# Patient Record
Sex: FEMALE | Race: BLACK OR AFRICAN AMERICAN
[De-identification: names, ages, dates, MRNs, and addresses within clinical notes are randomized per-mention and may not be internally consistent; named-entity substitution may affect disease eponyms.]

---

## 2019-03-07 ENCOUNTER — HOSPITAL ENCOUNTER (EMERGENCY)
Dept: HOSPITAL 62 - ER | Age: 19
LOS: 1 days | Discharge: ANOTHER HOSPITAL | End: 2019-03-08
Payer: OTHER GOVERNMENT

## 2019-03-07 VITALS — DIASTOLIC BLOOD PRESSURE: 72 MMHG | SYSTOLIC BLOOD PRESSURE: 114 MMHG

## 2019-03-07 DIAGNOSIS — R26.2: ICD-10-CM

## 2019-03-07 DIAGNOSIS — G24.9: Primary | ICD-10-CM

## 2019-03-07 DIAGNOSIS — R51: ICD-10-CM

## 2019-03-07 LAB
ADD MANUAL DIFF: NO
ALBUMIN SERPL-MCNC: 5 G/DL (ref 3.7–5.6)
ALP SERPL-CCNC: 65 U/L (ref 50–135)
ALT SERPL-CCNC: 14 U/L (ref 5–35)
ANION GAP SERPL CALC-SCNC: 12 MMOL/L (ref 5–19)
APPEARANCE ALL TUBES: CLEAR
APPEARANCE ALL TUBES: CLEAR
AST SERPL-CCNC: 20 U/L (ref 5–30)
BASOPHILS # BLD AUTO: 0.1 10^3/UL (ref 0–0.2)
BASOPHILS NFR BLD AUTO: 0.7 % (ref 0–2)
BILIRUB DIRECT SERPL-MCNC: 0.2 MG/DL (ref 0–0.4)
BILIRUB SERPL-MCNC: 0.3 MG/DL (ref 0.2–1.3)
BUN SERPL-MCNC: 12 MG/DL (ref 7–20)
CALCIUM: 10 MG/DL (ref 8.4–10.2)
CHLORIDE SERPL-SCNC: 112 MMOL/L (ref 98–107)
CK SERPL-CCNC: 844 U/L (ref 30–135)
CO2 SERPL-SCNC: 19 MMOL/L (ref 22–30)
COLOR ALL TUBES: COLORLESS
COLOR ALL TUBES: COLORLESS
CSF TOTAL VOLUME: 3.8 CC
CSF TOTAL VOLUME: 3.8 CC
CSF TUBE NUMBER: 1
CSF TUBE NUMBER: 4
EOSINOPHIL # BLD AUTO: 0.1 10^3/UL (ref 0–0.6)
EOSINOPHIL NFR BLD AUTO: 0.7 % (ref 0–6)
ERYTHROCYTE [DISTWIDTH] IN BLOOD BY AUTOMATED COUNT: 13.3 % (ref 11.5–14)
GLUCOSE CSF-MCNC: 51 MG/DL (ref 40–70)
GLUCOSE SERPL-MCNC: 85 MG/DL (ref 75–110)
HCT VFR BLD CALC: 39.2 % (ref 36–47)
HGB BLD-MCNC: 13.8 G/DL (ref 12–15.5)
LYMPHOCYTES # BLD AUTO: 2.3 10^3/UL (ref 0.5–4.7)
LYMPHOCYTES NFR BLD AUTO: 24.3 % (ref 13–45)
MCH RBC QN AUTO: 30.8 PG (ref 27–33.4)
MCHC RBC AUTO-ENTMCNC: 35.1 G/DL (ref 32–36)
MCV RBC AUTO: 88 FL (ref 80–97)
MONOCYTES # BLD AUTO: 0.6 10^3/UL (ref 0.1–1.4)
MONOCYTES NFR BLD AUTO: 6.1 % (ref 3–13)
NEUTROPHILS # BLD AUTO: 6.4 10^3/UL (ref 1.7–8.2)
NEUTS SEG NFR BLD AUTO: 68.2 % (ref 42–78)
PLATELET # BLD: 235 10^3/UL (ref 150–450)
POTASSIUM SERPL-SCNC: 4.2 MMOL/L (ref 3.6–5)
PROT SERPL-MCNC: 7.8 G/DL (ref 6.3–8.2)
PROTEIN,CSF: 28 MG/DL (ref 12–60)
RBC # BLD AUTO: 4.47 10^6/UL (ref 3.72–5.28)
SODIUM SERPL-SCNC: 143.4 MMOL/L (ref 137–145)
TOTAL CELLS COUNTED % (AUTO): 100 %
VOLUME TUBE 1: 1 CC
VOLUME TUBE 1: 1 CC
VOLUME TUBE 2: 0.9 CC
VOLUME TUBE 2: 0.9 CC
VOLUME TUBE 3: 0.9 CC
VOLUME TUBE 3: 0.9 CC
VOLUME TUBE 4: 1 CC
VOLUME TUBE 4: 1 CC
WBC # BLD AUTO: 9.3 10^3/UL (ref 4–10.5)

## 2019-03-07 PROCEDURE — 87205 SMEAR GRAM STAIN: CPT

## 2019-03-07 PROCEDURE — 36415 COLL VENOUS BLD VENIPUNCTURE: CPT

## 2019-03-07 PROCEDURE — 82945 GLUCOSE OTHER FLUID: CPT

## 2019-03-07 PROCEDURE — 80053 COMPREHEN METABOLIC PANEL: CPT

## 2019-03-07 PROCEDURE — 84702 CHORIONIC GONADOTROPIN TEST: CPT

## 2019-03-07 PROCEDURE — 85025 COMPLETE CBC W/AUTO DIFF WBC: CPT

## 2019-03-07 PROCEDURE — 99291 CRITICAL CARE FIRST HOUR: CPT

## 2019-03-07 PROCEDURE — 87070 CULTURE OTHR SPECIMN AEROBIC: CPT

## 2019-03-07 PROCEDURE — 89050 BODY FLUID CELL COUNT: CPT

## 2019-03-07 PROCEDURE — 96372 THER/PROPH/DIAG INJ SC/IM: CPT

## 2019-03-07 PROCEDURE — 83735 ASSAY OF MAGNESIUM: CPT

## 2019-03-07 PROCEDURE — 70450 CT HEAD/BRAIN W/O DYE: CPT

## 2019-03-07 PROCEDURE — 62270 DX LMBR SPI PNXR: CPT

## 2019-03-07 PROCEDURE — 83916 OLIGOCLONAL BANDS: CPT

## 2019-03-07 PROCEDURE — 84157 ASSAY OF PROTEIN OTHER: CPT

## 2019-03-07 PROCEDURE — 82550 ASSAY OF CK (CPK): CPT

## 2019-03-07 PROCEDURE — 96360 HYDRATION IV INFUSION INIT: CPT

## 2019-03-07 NOTE — RADIOLOGY REPORT (SQ)
EXAM DESCRIPTION: 



CT HEAD WITHOUT IV CONTRAST



COMPLETED DATE/TME:  03/07/2019 19:48



CLINICAL HISTORY: 



18 years, Female, involuntary clonic -right sided mostly



COMPARISON:

None.



TECHNIQUE:

Axial images of the head were performed without the use of

intravenous contrast, with sagittal and coronal reformatted

images.  Images stored on PACS.

 

All CT scanners at this facility use dose modulation, iterative

reconstruction, and/or weight based dosing when appropriate to

reduce radiation dose to as low as reasonably achievable (ALARA).





[facility]: Dose Right [facility]: CareDose   [facility]: Dose Right    [facility]:

Teradose 4D    [facility]: Smart Purple Blue Bo



LIMITATIONS:

None.



FINDINGS:



Somewhat limited examination due to motion artifact.



No evidence of acute hemorrhage or infarct. 



No evidence of mass or hydrocephalus.





IMPRESSION:



No acute finding.

 

TECHNICAL DOCUMENTATION:



Quality ID # [id number]: Final reports with documentation of one or more

dose reduction techniques (e.g., Automated exposure control,

adjustment of the mA and/or kV according to patient size, use of

iterative reconstruction technique)



copyright 2011 [employer]- All Rights Reserved

## 2019-03-07 NOTE — ER DOCUMENT REPORT
ED Medical Screen (RME)





- General


Chief Complaint: Tremor


Stated Complaint: TREMOR


Time Seen by Provider: 03/07/19 19:44


Primary Care Provider: 


JANELLE JEONG MD [Primary Care Provider] - Follow up as needed


Mode of Arrival: Wheelchair


Information source: Patient


Notes: 





18-year-old female presented to ED for complete body shaking.  She states she 

had similar symptoms since Monday.  She states yesterday she went to [facility] [place] they gave her some Valium and she got better.  She states as the day 

progresses today the shaking is gotten much worse.  Total body shaking started 

around 4 PM tonight.  I did consult Dr. Patino for this patient as she is unable 

to stop the shaking.  I have ordered 1 mg of Ativan IM.  Dr. Patino stated he 

would put orders in for the patient and I have transferred the patient back to 

room 20.











I have greeted and performed a rapid initial assessment of this patient.  A 

comprehensive ED assessment and evaluation of the patient, analysis of test 

results and completion of medical decision making process will be conducted by 

an additional ED providers.


TRAVEL OUTSIDE OF THE [place] IN LAST 30 DAYS: No





- Related Data


Allergies/Adverse Reactions: 


                                        





No Known Allergies Allergy (Unverified 03/07/19 16:59)


   











Physical Exam





- Vital signs


Vitals: 





                                        











Temp Pulse Resp BP Pulse Ox


 


 97.9 F   140 H  18   163/113 H  98 


 


 03/07/19 17:13  03/07/19 17:13  03/07/19 17:13  03/07/19 17:13  03/07/19 17:13














Course





- Vital Signs


Vital signs: 





                                        











Temp Pulse Resp BP Pulse Ox


 


 97.9 F   140 H  18   163/113 H  98 


 


 03/07/19 17:13  03/07/19 17:13  03/07/19 17:13  03/07/19 17:13  03/07/19 17:13














Doctor's Discharge





- Discharge


Referrals: 


JANELLE JEONG MD [Primary Care Provider] - Follow up as needed

## 2019-03-07 NOTE — ER DOCUMENT REPORT
Doctor's Note


Notes: 





03/07/19 22:18


I did assume care of this patient from the physician assistant.  In summary this

patient is a 18-year-old female who presents with 4 days of progressively 

worsening dystonia.  This appears to be largely exertional in nature.  On 

examination the patient has minimal jerking with bilateral upper extremity testi

ng however in the bilateral lower externally she has profound dystonic movements

with any effort both distally and proximally.  After multiple attempts I am able

to get her knee reflexes 2 result at approximate 2+ although her ankle reflexes 

are muted bilaterally.  She has no true strength loss, 5 out of 5 both distally 

and proximally bilateral upper and lower extremities.  Sensation is intact thr

oughout.  We attempt to ambulate the patient she is effectively completely 

unable to ambulance secondary to her degree of dystonic movements.  I did 

discuss with the neurologist on-call at [facility] Dr. Person who has 

accepted the patient in transfer.  She has recommended MRI imaging be completed 

at [facility].  We will proceed with lumbar puncture testing at her 

request to further eval for possible Guillain-Barr.





03/07/19 23:18


I have performed a lumbar puncture on this patient without any complication.  On

repeat assessment the patient continues to have significant dystonia particular 

in the bilateral lower extremities with exertion.  Transport is inbound.  CSF 

results pending.





Discharge





- Discharge


Clinical Impression: 


 Dystonia, Inability to walk





Condition: Fair


Disposition: [facility]


Referrals: 


JANELLE JEONG MD [ACTIVE STAFF] - Follow up as needed





Critical Care Note





- Critical Care Note


Total time excluding time spent on procedures (mins): 40


Comments: 





Critical care time spent obtaining history from patient or surrogate, 

discussions with consultants, development of treatment plan with patient or 

surrogate, evaluation of patient's response to treatment, examination of 

patient, ordering and performing treatments and interventions, ordering and 

review of laboratory studies, re-evaluation of patient's condition, ordering and

review of radiographic studies and review of old charts

## 2019-03-07 NOTE — ER DOCUMENT REPORT
ED General





- General


Chief Complaint: Tremor


Stated Complaint: TREMOR


Time Seen by Provider: 03/07/19 19:44


Primary Care Provider: 


JANELLE JEONG MD [ACTIVE STAFF] - Follow up as needed


Mode of Arrival: Wheelchair


Notes: 


18 female presents the emergency department for body shaking that is worse when 

sitting or standing.  She states it is improved when she lays down but it is 

still present.  She is not on any medications.  It is worse on her right side.  

Dates on Monday she was visiting her mom upstairs here on the fourth floor 

laying in bed with her and then when she got up to go to the bathroom she 

collapsed and started shaking.  Is been present ever since.  It got worse 

yesterday prompting her to go to the [facility] and she was given Valium 

which she did say it helped.  She went to the pharmacy for an outpatient 

prescription for Valium and never got it filled.  She says she does have 

significant discomfort and has a headache.  No loss of consciousness or altered 

mental status.  No paresis of any of her limbs.  Normal mentation.  No numbness 

or tingling in any of extremities.  No shortness of breath or chest pain.  No 

nausea, vomiting, diarrhea or any recent viral illness.  She is able to follow 

commands in all 4 extremities.  When I do strength testing it it causes greater 

discomfort.


TRAVEL OUTSIDE OF THE [place] IN LAST 30 DAYS: No





- Related Data


Allergies/Adverse Reactions: 


                                        





No Known Allergies Allergy (Unverified 03/07/19 16:59)


   











Past Medical History





- General


Information source: Patient





- Social History


Smoking Status: Never Smoker


Family History: Other - MS





Review of Systems





- Review of Systems


Constitutional: See HPI


EENT: No symptoms reported


Respiratory: No symptoms reported


Gastrointestinal: No symptoms reported


Genitourinary: No symptoms reported


Female Genitourinary: No symptoms reported


Musculoskeletal: No symptoms reported


Skin: No symptoms reported


Hematologic/Lymphatic: No symptoms reported


Neurological/Psychological: No symptoms reported





Physical Exam





- Vital signs


Vitals: 


                                        











Temp Pulse Resp BP Pulse Ox


 


 97.9 F   140 H  18   163/113 H  98 


 


 03/07/19 17:13  03/07/19 17:13  03/07/19 17:13  03/07/19 17:13  03/07/19 17:13














Course





- Re-evaluation


Re-evalutation: 





03/07/19 20:54


Patient in discomfort upon initial presentation with rigors on the right side of

her body.  She was given Ativan 1 mg IM in triage.  CT scan done.  No obvious 

pathology, tumors, mass-effect, midline shift, and intracranial bleed.  After 

revisiting her shaking his calm down and is not as severe.  Discussed with Dr. Lane and he is going to examine the patient.


03/08/19 03:22


Dr. Lane examined the patient and assumed care of the patient.





- Vital Signs


Vital signs: 


                                        











Temp Pulse Resp BP Pulse Ox


 


 97.9 F   140 H  15   114/72   100 


 


 03/07/19 17:13  03/07/19 17:13  03/07/19 22:36  03/07/19 22:36  03/07/19 22:36














- Laboratory


Result Diagrams: 


                                 03/07/19 20:00





                                 03/07/19 20:00


Laboratory results interpreted by me: 


                                        











  03/07/19





  20:00


 


Chloride  112 H


 


Carbon Dioxide  19 L


 


Creatine Kinase  844 H














Discharge





- Discharge


Clinical Impression: 


 Dystonia, Inability to walk





Condition: Fair


Disposition: [facility]


Referrals: 


JANELLE JEONG MD [ACTIVE STAFF] - Follow up as needed

## 2019-03-08 NOTE — ER DOCUMENT REPORT
ED General





- General


Chief Complaint: Tremor


Stated Complaint: TREMOR


Time Seen by Provider: 03/07/19 19:44


Primary Care Provider: 


JANELLE JEONG MD [ACTIVE STAFF] - Follow up as needed


Mode of Arrival: Wheelchair


Notes: 





18 female presents the emergency department for body shaking that is worse when 

sitting or standing.  She states it is improved when she lays down but it is 

still present.  She is not on any medications.  It is worse on her right side.  

Dates on Monday she was visiting her mom upstairs here on the fourth floor 

laying in bed with her and then when she got up to go to the bathroom she 

collapsed and started shaking.  Is been present ever since.  It got worse 

yesterday prompting her to go to the hospitals and she was given Valium 

which she did say it helped.  She went to the pharmacy for an outpatient 

prescription for Valium and never got it filled.  She says she does have 

significant discomfort and has a headache.  No loss of consciousness or altered 

mental status.  No paresis of any of her limbs.  Normal mentation.  No numbness 

or tingling in any of extremities.  No shortness of breath or chest pain.  No 

nausea, vomiting, diarrhea or any recent viral illness.  She is able to follow 

commands in all 4 extremities.  When I do strength testing it it causes greater 

discomfort.


TRAVEL OUTSIDE OF THE [place] IN LAST 30 DAYS: No





- Related Data


Allergies/Adverse Reactions: 


                                        





No Known Allergies Allergy (Unverified 03/07/19 16:59)


   











Past Medical History





- General


Information source: Patient





- Social History


Smoking Status: Current Every Day Smoker


Frequency of alcohol use: None


Drug Abuse: None


Lives with: Family


Family History: Reviewed & Not Pertinent


Patient has suicidal ideation: No


Patient has homicidal ideation: No


Renal/ Medical History: Denies: Hx Peritoneal Dialysis





Review of Systems





- Review of Systems


Notes: 





Constitutional: Negative for fever.


HENT: Negative for sore throat.


Eyes: Negative for visual changes.


Cardiovascular: Negative for chest pain.


Respiratory: Negative for shortness of breath.


Gastrointestinal: Negative for abdominal pain, vomiting or diarrhea.


Genitourinary: Negative for dysuria.


Musculoskeletal: Negative for back pain.


Skin: Negative for rash.


Neurological: Negative for headaches, positive for dystonia in lower extremities





10 point ROS negative except as marked above and in HPI.





Physical Exam





- Vital signs


Vitals: 


                                        











Temp Pulse Resp BP Pulse Ox


 


 97.9 F   140 H  18   163/113 H  98 


 


 03/07/19 17:13  03/07/19 17:13  03/07/19 17:13  03/07/19 17:13  03/07/19 17:13











Interpretation: Hypertensive, Tachycardic


Notes: 





PHYSICAL EXAMINATION:





GENERAL: Appears moderately uncomfortable but in no acute distress





HEAD: Atraumatic, normocephalic.





EYES: Pupils equal round and reactive to light, extraocular movements intact, 

sclera anicteric, conjunctiva are normal.





ENT: nares patent, oropharynx clear without exudates.  Moist mucous membranes.





NECK: Normal range of motion, supple without lymphadenopathy





LUNGS: Breath sounds clear to auscultation bilaterally and equal.  No wheezes 

rales or rhonchi.





HEART: Regular rate and rhythm without murmurs





ABDOMEN: Soft, nontender, normoactive bowel sounds.  No guarding, no rebound.  

No masses appreciated.





EXTREMITIES: Normal range of motion, no pitting or edema.  No cyanosis.





NEUROLOGICAL: Face symmetric.  Tongue protrudes midline.  Extraocular motions 

intact.  Pupils are 2 mm and equally reactive.  Normal speech.  5 out of 5 

strength in both the distal and proximal upper and lower extremities 

bilaterally.  However particular in the lower extremity as there is a 

stuttering, dystonic reaction with effort.  When walking the patient has 

spasming, dystonic type movements of her legs in particular is unable to ablate 

without a 2 person assist.  2+ knee reflexes bilaterally, mute in the ankles 

bilaterally.  Sensation is grossly intact throughout.  Finger to nose testing 

normal.  Pronator drift normal.





PSYCH: Relaxed for circumstance no obvious anxiety





SKIN: Warm, Dry, normal turgor, no rashes or lesions noted.





Course





- Re-evaluation


Re-evalutation: 





03/08/19 01:24


03/07/19 22:18


I did assume care of this patient from the physician assistant.  In summary this

patient is a 18-year-old female who presents with 4 days of progressively 

worsening dystonia.  This appears to be largely exertional in nature.  On 

examination the patient has minimal jerking with bilateral upper extremity 

testing however in the bilateral lower externally she has profound dystonic 

movements with any effort both distally and proximally.  After multiple attempts

I am able to get her knee reflexes 2 result at approximate 2+ although her ankle

reflexes are muted bilaterally.  She has no true strength loss, 5 out of 5 both 

distally and proximally bilateral upper and lower extremities.  Sensation is 

intact throughout.  We attempt to ambulate the patient she is effectively 

completely unable to ambulance secondary to her degree of dystonic movements.  I

did discuss with the neurologist on-call at [facility] Dr. Person who 

has accepted the patient in transfer.  She has recommended MRI imaging be 

completed at [facility].  We will proceed with lumbar puncture 

testing at her request to further eval for possible Guillain-Barr.





03/07/19 23:18


I have performed a lumbar puncture on this patient without any complication.  On

repeat assessment the patient continues to have significant dystonia particular 

in the bilateral lower extremities with exertion.  Transport is inbound.  CSF 

results pending.





- Vital Signs


Vital signs: 


                                        











Temp Pulse Resp BP Pulse Ox


 


 97.9 F   140 H  15   114/72   100 


 


 03/07/19 17:13  03/07/19 17:13  03/07/19 22:36  03/07/19 22:36  03/07/19 22:36














- Laboratory


Result Diagrams: 


                                 03/07/19 20:00





                                 03/07/19 20:00


Laboratory results interpreted by me: 


                                        











  03/07/19





  20:00


 


Chloride  112 H


 


Carbon Dioxide  19 L


 


Creatine Kinase  844 H














Procedures





- Lumbar Puncture


  ** Lumbar puncture


Consent obtained: Yes


Lumbar puncture pre-procedure: Sterile PPE donned, Chloraprep applied, Sterile 

drapes applied


Patient position: Sitting


Needle size: 22


Lumbar puncture location: l4-5


Anesthetic type: 1% Lidocaine


mL's of anesthetic: 4


Amount/type of drainage: 5cc clear


Number of attempts: 1


Complications: No





Critical Care Note





- Critical Care Note


Total time excluding time spent on procedures (mins): 40


Comments: 





Critical care time spent obtaining history from patient or surrogate, 

discussions with consultants, development of treatment plan with patient or 

surrogate, evaluation of patient's response to treatment, examination of 

patient, ordering and performing treatments and interventions, ordering and 

review of laboratory studies, re-evaluation of patient's condition, ordering and

review of radiographic studies and review of old charts





Discharge





- Discharge


Clinical Impression: 


 Dystonia, Inability to walk





Condition: Fair


Disposition: [facility]


Referrals: 


JANELLE JEONG MD [ACTIVE STAFF] - Follow up as needed